# Patient Record
Sex: FEMALE | Race: WHITE | NOT HISPANIC OR LATINO | Employment: FULL TIME | URBAN - NONMETROPOLITAN AREA
[De-identification: names, ages, dates, MRNs, and addresses within clinical notes are randomized per-mention and may not be internally consistent; named-entity substitution may affect disease eponyms.]

---

## 2017-01-12 ENCOUNTER — COMMUNICATION - GICH (OUTPATIENT)
Dept: FAMILY MEDICINE | Facility: OTHER | Age: 37
End: 2017-01-12

## 2017-05-26 ENCOUNTER — HISTORY (OUTPATIENT)
Dept: EMERGENCY MEDICINE | Facility: OTHER | Age: 37
End: 2017-05-26

## 2017-06-02 ENCOUNTER — HOSPITAL ENCOUNTER (OUTPATIENT)
Dept: PHYSICAL THERAPY | Facility: OTHER | Age: 37
Setting detail: THERAPIES SERIES
End: 2017-06-02
Attending: PHYSICIAN ASSISTANT

## 2017-06-02 DIAGNOSIS — S83.011A: ICD-10-CM

## 2017-06-05 ENCOUNTER — HOSPITAL ENCOUNTER (OUTPATIENT)
Dept: PHYSICAL THERAPY | Facility: OTHER | Age: 37
Setting detail: THERAPIES SERIES
End: 2017-06-05
Attending: PHYSICIAN ASSISTANT

## 2017-12-28 NOTE — PROGRESS NOTES
"Patient Information     Patient Name MRN Sex Harleen Gomez [id number] Female 1980      Progress Notes by Jenni Sandoval PT at 2017  3:48 PM     Author:  Jenni Sandoval PT Service:  (none) Author Type:  PT- Physical Therapist     Filed:  2017 12:48 PM Date of Service:  2017  3:48 PM Status:  Signed     :  Jenni Sandoval PT (PT- Physical Therapist)            [facility]  Outpatient PT - Daily note    Date of Service: 2017   Visit #2    PSFS Visit:  2/10  PSFS Completed:  2017     Patient Name: Harleen Riddle   YOB: 1980   Referring MD/Provider: Tyson Burleson PA  Medical and Treatment Diagnosis: lateral subluxation of patella, Right  PT Treatment Diagnosis: impaired right knee range of motion and strength  Insurance: Other: IM Care  Start of Service: 17  Certification Dates: Start of Service: 17  Medicare/MA Re-Cert Due: 17     Subjective        Tape and ice machine really helped. Had minimal pain until she took the tape off last night.    Rates pain: 3/10    Objective  DATE:         ROM:  Supine knee flexion  65 A  85 P 120 P          Today's Intervention:    - bike seat 4 no resistance forward and retro x3 mins, level 6 x3 mins    Standing hip 3 way 3# with ankle weight    Med x   Leg press 140# seat 19 with ball squeeze between knees   Adduction 70#   Abduction 40#    Step ups 6\"     Knee range of motion and patellar mobilization x5 mins    kinesiotape application facilitating medial patellar glide and patellar lift   -2 i strips    Game ready x15 mins. Patient supine with bolster under knee     Home Exercise Program:    Access Code: [email or web address] URL: [email or web address] Date: 2017 Prepared by: Jenni Sandoval   Exercises   Sitting Heel Slide with Towel - 10-15 reps - 1-2 sets - 5 seconds hold - 1x daily   Seated Long Arc Quad with Hip Adduction - 10-15 reps - 1-2 sets - 5 seconds " hold - 1x daily     Assessment    Patient demonstrates excellent progression of knee range of motion 35 degrees since initial visit last week. Able to tolerate therapeutic exercise without significant increase in pain.    Patient will benefit from continued skilled physical therapy services to address aforementioned impairments and return patient to previous level of functioning.      Completed 6/2/2017:  Patient Specific Functional and Pain Scales (PSFS)  Clinician Instructions: Complete after the history and before the exam.   Initial Assessment:   We want to know what 3 activities in your life you are unable to perform, or are having the most difficulty performing, as a result of your chief problem. Please list and score at least 3 activities that you are unable to perform, or having the most difficulty performing, because of your chief problem.   Patient Specific Activity Scoring Scheme (score one number for each activity):   Activity Score (0-10)  0= Unable to perform activity  10= Able to perform activity at same level as before injury or problem   1. Walking up and down stairs reciprocally 3/10   2. Sit to stand 4/10   3. sleeping 3/10   4.  /10   5.  /10   Totals:  10/30= 33% ability, 67% impairment   Patient verbally states that they understand that the information they have provided above is current and complete to the best of their knowledge.   Patient Specific Functional Scale Modifier Scale Conversion: (patient's modifier that correlates with pt's score on PSFS): 4-CL (60% Impaired).  Initial Primary G Code and Modifier:    Per the Patient's intake and/or assessment the Primary G Code is: Mobility .   The Patient's Impairment, Limitation or Restriction Modifier would be best described as: CK - 40% - 60% Impairment.   Goal Primary G Code and Modifier:    The Patient's G Code Goal would be: Mobility    The Patient's Impairment, Limitation or Restriction Modifier goal would be best described as:  CI - 1% - 20% Impairment.     Goals:  Patient goal:  Improve knee pain in 8 weeks.    Functional Short Term Goals (4 weeks):   Patient will demonstrate improved swelling to allow knee flexion to at least 100 degrees.  Patient will have normal gait pattern without assistive device use even with normal daily walking.    Functional Long Term Goals (8 weeks):   Patient will have at least 115 degrees knee flexion with minimal increase in discomfort.  Patient will have improved lower extremity strength to at least 4/5 for ability to negotiate stairs reciprocally.  Patient will be independent in her home exercise program.    Plan    Treatment Plan:      Frequency:   16 visits    Duration of Treatment: 8 weeks    Plan for next visit:  Upright bike, knee range of motion and stretching, strengthening and progress home exercise program as able.    Thank you for your referral to [facility].  Please call with any questions, concerns or comments.  [phone]  Jenni Sandoval, PT, DPT

## 2017-12-29 NOTE — DISCHARGE SUMMARY
Patient Information     Patient Name MRN Sex Harleen Allen [id number] Female 1980      Discharge Summaries by Jenni Sandoval PT at 2017  4:27 PM     Author:  Jenni Sandoval PT Service:  (none) Author Type:  PT- Physical Therapist     Filed:  2017  4:28 PM Date of Service:  2017  4:27 PM Status:  Signed     :  Jenni Sandoval PT (PT- Physical Therapist)            [facility]  Outpatient PT - Discharge Summary    Date of discharge: 2017     Patient Name: Harleen Riddle   YOB: 1980   Referring MD/Provider: Tyson Burleson PA  Medical and Treatment Diagnosis: lateral subluxation of patella, Right  PT Treatment Diagnosis: impaired right knee range of motion and strength  Insurance: Other:  Care  Start of Service: 17    Dates of Service: 17    Thru:  17  Number of visits: 2           Reason for Discharge:  Patient discontinued Therapy for unknown reasons  Patient failed to schedule further appointments    Progress from Initial to Discharge (if applicable):    Comments: Please see last visit note from 17 for details of patient progress. This is an unplanned discharge.    Further Recommendations:    Patient will continue with established home exercise program  Patient will return to physician for follow-up.    Patient is discharged from Physical Therapy    Thank you for your referral to [facility].  Please call with any questions, concerns or comments.  [phone]          Jenni Sandoval PT, DPT

## 2017-12-30 NOTE — INITIAL ASSESSMENTS
Patient Information     Patient Name MRN Sex Harleen Gomez [id number] Female 1980      Initial Assessments by Jenni Sandoval PT at 2017 11:23 AM     Author:  Jenni Sandoval PT Service:  (none) Author Type:  PT- Physical Therapist     Filed:  2017  1:17 PM Date of Service:  2017 11:23 AM Status:  Signed     :  Jenni Sandoval PT (PT- Physical Therapist)            [facility]  Outpatient PT - Initial Evaluation  Lower Extremity Eval    Date of Service: 2017   Visit #1    PSFS Visit:  1/10  PSFS Completed:  2017     Patient Name: Harleen Riddle   YOB: 1980   Referring MD/Provider: Tyson Burleson PA  Medical and Treatment Diagnosis: lateral subluxation of patella, Right  PT Treatment Diagnosis: impaired right knee range of motion and strength  Insurance: Other: IM Care  Start of Service: 17  Certification Dates: Start of Service: 17  Medicare/MA Re-Cert Due: 17     Precautions:  None   Cognition:  Oriented to Person, Place, and Time.     Were cultural / age or other special adaptations needed? No      Patient is a vulnerable adult: No      Patient is aware of diagnosis: Yes      Risks and benefits explained: Yes    Subjective        Date of onset: 17 twisting on planted knee, heard a pop with pain in the right knee. Was seen in the ED with xrays showing no fracture.  Follow up with physician:    Details: difficulty with knee bending and fully straightening. A lot of walking bothers and being in one position too long bothers.    Rates pain: 4-5/10  Describes pain: dull, cramping; sharp after a lot of walking  Locates pain: suprapatellar and medial knee and thigh, kneecap  Aggravating: walking (after), stairs, sit to stand  Easing: ice      Completed 2017:  Patient Specific Functional and Pain Scales (PSFS)  Clinician Instructions: Complete after the history and before the exam.   Initial Assessment:    We want to know what 3 activities in your life you are unable to perform, or are having the most difficulty performing, as a result of your chief problem. Please list and score at least 3 activities that you are unable to perform, or having the most difficulty performing, because of your chief problem.   Patient Specific Activity Scoring Scheme (score one number for each activity):   Activity Score (0-10)  0= Unable to perform activity  10= Able to perform activity at same level as before injury or problem   1. Walking up and down stairs reciprocally 3/10   2. Sit to stand 4/10   3. sleeping 3/10   4.  /10   5.  /10   Totals:  10/30= 33% ability, 67% impairment   Patient verbally states that they understand that the information they have provided above is current and complete to the best of their knowledge.   Patient Specific Functional Scale Modifier Scale Conversion: (patient's modifier that correlates with pt's score on PSFS): 4-CL (60% Impaired).  Initial Primary G Code and Modifier:    Per the Patient's intake and/or assessment the Primary G Code is: Mobility .   The Patient's Impairment, Limitation or Restriction Modifier would be best described as: CK - 40% - 60% Impairment.   Goal Primary G Code and Modifier:    The Patient's G Code Goal would be: Mobility    The Patient's Impairment, Limitation or Restriction Modifier goal would be best described as: CI - 1% - 20% Impairment.     Functional difficulties: (Min / Mod / Max / Unable)   mod difficulty sleeping     mod difficulty standing 30 minutes     max difficulty walking up and down stairs reciprocally    mod difficulty walking on level ground    max difficulty walking on uneven/slopes ground   unable difficulty running for 30 min.   unable difficulty squatting to lift boxes from floor      Prior Level:  Minimal to no difficulty completing functional activities.      Past Medical History:     Diagnosis  Date     Abnormal Pap smear 2003      Genital warts      History of chlamydia 2002     treated with Zithromax      History of pregnancy      ,History of greater than 4000 gram infant      Past Surgical History:      Procedure  Laterality Date     PA INCISION AND DRAINAGE OF HEMATOMA/SEROMA OR FLUID  2010    post-tubal ligation, rt salpingectomy       PA LIGATE FALLOPIAN TUBE  2010     Driving:  yes  Home Environment:  Patient lives in a 2story home with flight of steps to enter and 1 flights up/down stairs.  Assistive Devices: no    Occupation:  Direct support person, involves driving and cooking    Previous Treatment:    Pain Meds / Anti-inflammatory Meds  - Yes ibu  Physical Therapy - no  Injections - no  Surgery - No  Pain Clinic - No    Diagnostics:  Reviewed (see chart)   Current Medications:  Reviewed (see chart)    Drug Allergies:  Reviewed (see chart)  ?   Latex Allergy:  No    Objective    Items left blank indicate that the test was inappropriate or not meaningful at the time of evaluation and therefore not performed.    Fall Risk Screening:  No risk factors identified     ROM / Strength:                 Norms Left  Right Strength Left  Right   Hip Flexion 120    Hip Flexion  3-   Hip Extension 15   Hip Extension  3   Hip Abduction 50   Hip Abduction  3   Hip External Rot. 60   Hip External Rot.     Hip Internal Rot. 40   Hip Internal Rot.     Knee Extension 0 0 -5 A  0 P Knee Extension  3   Knee Flexion 135 125 65 A  85 P Knee Flexion  4+   Dorsiflexion 20   Dorsiflexion     Plantarflexion    Plantarflexion       Observation:  Caron patellar swelling evident, lateral and superior pole    Palpation:  Mild tenderness to quad and patellar tendon    Gait:  Mild to moderate antalgia without assistive device     Special Tests:      Knee special tests Result   Varus/Valgus stress + varus   Posterior sag sign -   Lachman NT   Anterior drawer NT   Pivot shift -   Baldomero s -   Apley s -   Thessaly NT   Patellar compression +     Today's  Intervention:    - Evaluation  - Patient education for results of evaluation, goals, and treatment plan.  Patient voices understanding and agreement.    kinesiotape application facilitating medial patellar glide and patellar lift   -2 i strips   -Patient was instructed in care of tape. Instructed to remove tape if skin becomes red, itchy, or any other adverse reaction occurs, and wash with soap and water. Tape can be left on for 2-4 days as tolerated and can be worn in the shower. Instructed to gently towel-dry over tape and to never use a hair dryer on tape. Recommended to remove tape the night before, or the morning of, the next physical therapy appointment to allow skin rest time. Patient voiced understanding.    - Intro to home exercise program   - attempted mini squats but too painful so DC     Game ready x10 mins. Patient supine with bolster under knee     Home Exercise Program:    Access Code: [email or web address] URL: [email or web address] Date: 06/02/2017 Prepared by: Jenni Sandoval   Exercises   Sitting Heel Slide with Towel - 10-15 reps - 1-2 sets - 5 seconds hold - 1x daily   Seated Long Arc Quad with Hip Adduction - 10-15 reps - 1-2 sets - 5 seconds hold - 1x daily     Assessment    Therapist Assessment / Clinical Impression:  Signs and symptoms consistent with ligamentous injury resulting in patellar compression and abnormal joint mechanics with normal activities of daily living. Patient will benefit from continued skilled physical therapy services to address aforementioned impairments and return patient to previous level of functioning.      Functional Impairment(s): See subjective on initial evaluation and Functional Assessment / Summary Report from PSFS.    Physical Impairment(s):  Knee range of motion and strength      Goals:  Patient goal:  Improve knee pain in 8 weeks.    Functional Short Term Goals (4 weeks):   Patient will demonstrate improved swelling to allow knee flexion to at least 100  degrees.  Patient will have normal gait pattern without assistive device use even with normal daily walking.    Functional Long Term Goals (8 weeks):   Patient will have at least 115 degrees knee flexion with minimal increase in discomfort.  Patient will have improved lower extremity strength to at least 4/5 for ability to negotiate stairs reciprocally.  Patient will be independent in her home exercise program.    Patient participated in goal selection and understand(s) the plan of care: Yes   Patient Potential for Achieving Desired Outcome: Good       Response to Intervention:  Good tolerance to treatment     Plan    Treatment Plan:      Frequency:   16 visits    Duration of Treatment: 8 weeks    Planned Interventions:    Home Exercise Program development  Therapeutic Exercise (ROM & Strengthening)  Therapeutic Activities  Neuromuscular Re-education  Manual Therapy  Ultrasound  E-Stim  Gait Training  Hot Packs / Cold Pack Modalities  Vasopneumatic Compression with Cold Therapy ( Game Ready )  Phonophoresis with Ketoprofen  Iontophoresis with Dexamethasone    Plan for next visit:  Upright bike, knee range of motion and stretching, strengthening and progress home exercise program as able.    Thank you for your referral to [facility].  Please call with any questions, concerns or comments.  [phone]  Jenni Sandoval PT, DPT    The signature, of the referring medical provider, on this document indicates certification of the above prescribed plan of care and is medically necessary.

## 2018-01-02 NOTE — TELEPHONE ENCOUNTER
Patient Information     Patient Name MRN Harleen Nicholas [id number] Female 1980      Telephone Encounter by Ashley Bray at 2017  9:18 AM     Author:  Ashley Bray Service:  (none) Author Type:  (none)     Filed:  2017  9:18 AM Encounter Date:  2017 Status:  Signed     :  Ashley Bray            Form changed, and re-faxed.  Ashley Bray LPN .............2017  9:18 AM

## 2018-01-03 NOTE — TELEPHONE ENCOUNTER
Patient Information     Patient Name MRN Harleen Nicholas [id number] Female 1980      Telephone Encounter by Ashley Bray at 2017  9:02 AM     Author:  Ashley Bray Service:  (none) Author Type:  (none)     Filed:  2017  9:18 AM Encounter Date:  2017 Status:  Signed     :  Ashley Bray            Patient is calling wondering if Arik Nayak MD can change the dates on mar[employer] form that she was absent from work starting on -12/15. They wont approve the marcos absent unless its at least 4 days. Form was only completed for 3 days. Patient states if form is not changed she is over her amount of absences and will be terminated from work at [employer]. States a[employer]wick said ok to change old form and initial where changes are made.  Ashley Bray LPN .............2017  9:08 AM

## 2018-02-12 ENCOUNTER — DOCUMENTATION ONLY (OUTPATIENT)
Dept: FAMILY MEDICINE | Facility: OTHER | Age: 38
End: 2018-02-12

## 2018-02-12 PROBLEM — Z72.0 TOBACCO ABUSE: Status: ACTIVE | Noted: 2018-02-12

## 2018-02-12 RX ORDER — IBUPROFEN 600 MG/1
600 TABLET, FILM COATED ORAL 4 TIMES DAILY PRN
COMMUNITY
Start: 2017-05-26 | End: 2018-11-21

## 2018-03-25 ENCOUNTER — HEALTH MAINTENANCE LETTER (OUTPATIENT)
Age: 38
End: 2018-03-25

## 2018-11-21 ENCOUNTER — OFFICE VISIT (OUTPATIENT)
Dept: FAMILY MEDICINE | Facility: OTHER | Age: 38
End: 2018-11-21
Attending: NURSE PRACTITIONER
Payer: COMMERCIAL

## 2018-11-21 VITALS
TEMPERATURE: 97.5 F | HEART RATE: 76 BPM | BODY MASS INDEX: 39.68 KG/M2 | OXYGEN SATURATION: 98 % | WEIGHT: 234.8 LBS | RESPIRATION RATE: 16 BRPM

## 2018-11-21 DIAGNOSIS — H60.501 ACUTE OTITIS EXTERNA OF RIGHT EAR, UNSPECIFIED TYPE: Primary | ICD-10-CM

## 2018-11-21 PROCEDURE — 99213 OFFICE O/P EST LOW 20 MIN: CPT | Performed by: NURSE PRACTITIONER

## 2018-11-21 PROCEDURE — G0463 HOSPITAL OUTPT CLINIC VISIT: HCPCS

## 2018-11-21 RX ORDER — OFLOXACIN 3 MG/ML
10 SOLUTION AURICULAR (OTIC) 2 TIMES DAILY
Qty: 10 ML | Refills: 0 | Status: SHIPPED | OUTPATIENT
Start: 2018-11-21 | End: 2018-11-28

## 2018-11-21 ASSESSMENT — PAIN SCALES - GENERAL: PAINLEVEL: MILD PAIN (2)

## 2018-11-21 NOTE — PROGRESS NOTES
HPI:    Harleen Riddle is a 38 year old female who presents to clinic today for right ear pain. Has had right ear pain since yesterday. Pain worse with pressure being applied. No cold sx. No fevers. No hx of OM.     Past Medical History:   Diagnosis Date     Anogenital (venereal) warts     No Comments Provided     Personal history of other infectious and parasitic diseases     , treated with Zithromax     Personal history of other medical treatment (CODE)          Personal history of other medical treatment (CODE)     2003, ,History of greater than 4000 gram infant       Past Surgical History:   Procedure Laterality Date     OTHER SURGICAL HISTORY      ,01095.0,MD LIGATE FALLOPIAN TUBE     OTHER SURGICAL HISTORY      ,76555.0,MD INCISION AND DRAINAGE OF HEMATOMA/SEROMA OR FLUID,post-tubal ligation, rt salpingectomy       Family History   Problem Relation Age of Onset     Thyroid Disease Mother      Thyroid Disease     Hypertension Father      Hypertension     Other - See Comments Father      Back problems     Genetic Disorder Other      Genetic,Mother History of thyroid disease.-Father History of hypertension and back problems.-Siblings Sister with history of Down syndrome. -Brother with history of mental retardation.  -Mukesh  Son born 2003-Bianca  Daughter born 10/06     Other - See Comments Sister      Downs syndrome     Other - See Comments Brother      Mental retardation       Social History     Social History     Marital status: Single     Spouse name: HAYDEE Madrigal     Number of children: N/A     Years of education: 12     Occupational History     Not on file.     Social History Main Topics     Smoking status: Current Some Day Smoker     Types: Cigarettes     Start date: 10/23/1998     Smokeless tobacco: Never Used     Alcohol use 0.0 oz/week      Comment: Alcoholic Drinks/day: rare     Drug use: No     Sexual activity: Yes     Other Topics Concern     Not on file     Social History  Narrative     Works at [employer]    Mukesh  Son born 8/2003    Bianca  Daughter born 10/06    Daughter:  Natalia       Current Outpatient Prescriptions   Medication Sig Dispense Refill     ofloxacin (FLOXIN) 0.3 % otic solution Place 10 drops into the right ear 2 times daily for 7 days 10 mL 0       No Known Allergies    ROS:  Pertinent positives and negatives are noted in HPI.    EXAM:  General appearance: well appearing female in no acute distress  Head: normocephalic, atraumatic  Ears: TM's with cone of light, no erythema, right canal with significant swelling. Pain with all movement of ear  Eyes: conjunctivae normal  Orophayrnx: moist mucous membranes, tonsils without erythema, exudates or petechiae, no post nasal drip seen  Neck: supple without adenopathy  Respiratory: clear to auscultation bilaterally  Cardiac: RRR with no murmurs  Psychological: normal affect, alert and pleasant    ASSESSMENT AND PLAN:    1. Acute otitis externa of right ear, unspecified type      Tx AOE with ofloxacin. Reviewed need to complete all antibiotics. Discussed typical course of illness, symptomatic treatment and when to return to clinic. Patient in agreement with plan and all questions were answered.         Jenn Cote..................11/21/2018 5:32 PM

## 2018-11-21 NOTE — PATIENT INSTRUCTIONS
External Ear Infection (Adult)    External otitis (also called  swimmer s ear ) is an infection in the ear canal. It is often caused by bacteria or fungus. It can occur a few days after water gets trapped in the ear canal (from swimming or bathing). It can also occur after cleaning too deeply in the ear canal with a cotton swab or other object. Sometimes, hair care products get into the ear canal and cause this problem.  Symptoms can include pain, fever, itching, redness, drainage, or swelling of the ear canal. Temporary hearing loss may also occur.  Home care    Do not try to clean the ear canal. This can push pus and bacteria deeper into the canal.    Use prescribed ear drops as directed. These help reduce swelling and fight the infection. If an ear wick was placed in the ear canal, apply drops right onto the end of the wick. The wick will draw the medicine into the ear canal even if it is swollen closed.    A cotton ball may be loosely placed in the outer ear to absorb any drainage.    You may use acetaminophen or ibuprofen to control pain, unless another medicine was prescribed. Note: If you have chronic liver or kidney disease or ever had a stomach ulcer or GI bleeding, talk to your healthcare provider before taking any of these medicines.    Do not allow water to get into your ear when bathing. Also, don't swim until the infection has cleared.  Prevention    Keep your ears dry. This helps lower the risk of infection. Dry your ears with a towel or hair dryer after getting wet. Also, use ear plugs when swimming.    Do not stick any objects in the ear to remove wax.    If you feel water trapped in your ear, use ear drops right away. You can get these drops over the counter at most drugstores. They work by removing water from the ear canal.  Follow-up care  Follow up with your healthcare provider in 1 week, or as advised.  When to seek medical advice  Call your healthcare provider right away if any of these  occur:    Ear pain becomes worse or doesn t improve after 3 days of treatment    Redness or swelling of the outer ear occurs or gets worse    Headache    Painful or stiff neck    Drowsiness or confusion    Fever of 100.4 F (38 C) or higher, or as directed by your healthcare provider    Seizure  Date Last Reviewed: 10/1/2017    9962-5904 [employer]. [address] [place] [address]. All rights reserved. This information is not intended as a substitute for professional medical care. Always follow your healthcare professional's instructions.

## 2018-11-21 NOTE — MR AVS SNAPSHOT
After Visit Summary   11/21/2018    Harleen Riddle    MRN: [id number]           Patient Information     Date Of Birth          1980        Visit Information        Provider Department      11/21/2018 5:15 PM Jenn Cote APRN CNP [facility]        Today's Diagnoses     Acute otitis externa of right ear, unspecified type    -  1      Care Instructions      External Ear Infection (Adult)    External otitis (also called  swimmer s ear ) is an infection in the ear canal. It is often caused by bacteria or fungus. It can occur a few days after water gets trapped in the ear canal (from swimming or bathing). It can also occur after cleaning too deeply in the ear canal with a cotton swab or other object. Sometimes, hair care products get into the ear canal and cause this problem.  Symptoms can include pain, fever, itching, redness, drainage, or swelling of the ear canal. Temporary hearing loss may also occur.  Home care    Do not try to clean the ear canal. This can push pus and bacteria deeper into the canal.    Use prescribed ear drops as directed. These help reduce swelling and fight the infection. If an ear wick was placed in the ear canal, apply drops right onto the end of the wick. The wick will draw the medicine into the ear canal even if it is swollen closed.    A cotton ball may be loosely placed in the outer ear to absorb any drainage.    You may use acetaminophen or ibuprofen to control pain, unless another medicine was prescribed. Note: If you have chronic liver or kidney disease or ever had a stomach ulcer or GI bleeding, talk to your healthcare provider before taking any of these medicines.    Do not allow water to get into your ear when bathing. Also, don't swim until the infection has cleared.  Prevention    Keep your ears dry. This helps lower the risk of infection. Dry your ears with a towel or hair dryer after getting wet. Also, use ear plugs when  swimming.    Do not stick any objects in the ear to remove wax.    If you feel water trapped in your ear, use ear drops right away. You can get these drops over the counter at most drugstores. They work by removing water from the ear canal.  Follow-up care  Follow up with your healthcare provider in 1 week, or as advised.  When to seek medical advice  Call your healthcare provider right away if any of these occur:    Ear pain becomes worse or doesn t improve after 3 days of treatment    Redness or swelling of the outer ear occurs or gets worse    Headache    Painful or stiff neck    Drowsiness or confusion    Fever of 100.4 F (38 C) or higher, or as directed by your healthcare provider    Seizure  Date Last Reviewed: 10/1/2017    2003-3339 [employer]. [address] [place] [address], [place] [address]. All rights reserved. This information is not intended as a substitute for professional medical care. Always follow your healthcare professional's instructions.                Follow-ups after your visit        Who to contact     If you have questions or need follow up information about today's clinic visit or your schedule please contact [facility] directly at [phone].  Normal or non-critical lab and imaging results will be communicated to you by MyChart, letter or phone within 4 business days after the clinic has received the results. If you do not hear from us within 7 days, please contact the clinic through [employer] or phone. If you have a critical or abnormal lab result, we will notify you by phone as soon as possible.  Submit refill requests through [email or web address] or call your pharmacy and they will forward the refill request to us. Please allow 3 business days for your refill to be completed.          Additional Information About Your Visit        Care EveryWhere ID     This is your Care EveryWhere ID. This could be used by other organizations to access your [facility]  records  [id number]        Your Vitals Were     Pulse Temperature Respirations Last Period Pulse Oximetry Breastfeeding?    76 97.5  F (36.4  C) (Tympanic) 16 10/21/2018 98% No    BMI (Body Mass Index)                   39.68 kg/m2            Blood Pressure from Last 3 Encounters:   12/13/16 112/84   01/12/16 (!) 140/100   12/31/14 124/84    Weight from Last 3 Encounters:   11/21/18 234 lb 12.8 oz (106.5 kg)   12/13/16 227 lb 12.8 oz (103.3 kg)   01/12/16 243 lb (110.2 kg)              Today, you had the following     No orders found for display         Today's Medication Changes          These changes are accurate as of 11/21/18  5:40 PM.  If you have any questions, ask your nurse or doctor.               Start taking these medicines.        Dose/Directions    ofloxacin 0.3 % otic solution   Commonly known as:  FLOXIN   Used for:  Acute otitis externa of right ear, unspecified type   Started by:  Jenn Cote APRN CNP        Dose:  10 drop   Place 10 drops into the right ear 2 times daily for 7 days   Quantity:  10 mL   Refills:  0         Stop taking these medicines if you haven't already. Please contact your care team if you have questions.     ibuprofen 600 MG tablet   Commonly known as:  ADVIL/MOTRIN   Stopped by:  Jenn Cote APRN CNP                Where to get your medicines      These medications were sent to [facility] [address] [place] [address]     Phone:  [phone]     ofloxacin 0.3 % otic solution                Primary Care Provider Office Phone # Fax #    Bethany VASILE Gomes -[phone]       [address] [facility] [address]        Equal Access to Services     DWIGHT LLOYD AH: Gato Pedraza, wavitoda luqadaha, qaybta kaalteresa fields, yamini english. So [facility] [phone].    ATENCIÓN: Si ashleyla español, tiene a bazan disposición servicios  tony de asistencia lingüística. Elana arce [phone].    We comply with applicable federal civil rights laws and [place] laws. We do not discriminate on the basis of race, color, national origin, age, disability, sex, sexual orientation, or gender identity.            Thank you!     Thank you for choosing [facility]  for your care. Our goal is always to provide you with excellent care. Hearing back from our patients is one way we can continue to improve our services. Please take a few minutes to complete the written survey that you may receive in the mail after your visit with us. Thank you!             Your Updated Medication List - Protect others around you: Learn how to safely use, store and throw away your medicines at www.disposemymeds.org.          This list is accurate as of 11/21/18  5:40 PM.  Always use your most recent med list.                   Brand Name Dispense Instructions for use Diagnosis    ofloxacin 0.3 % otic solution    FLOXIN    10 mL    Place 10 drops into the right ear 2 times daily for 7 days    Acute otitis externa of right ear, unspecified type

## 2018-11-21 NOTE — NURSING NOTE
EAR PAIN  Onset: yesterday  Location:  right  Fever:  no  Recent URI:  no  Discharge:  no  Able to sleep:  no  Pain Scale:  2, 8 with palpation  Gracia Rose LPN .............11/21/2018  5:31 PM  Medication Reconciliation: complete    Gracia Rose LPN  ..................11/21/2018   5:31 PM

## 2020-10-09 ENCOUNTER — VIRTUAL VISIT (OUTPATIENT)
Dept: FAMILY MEDICINE | Facility: OTHER | Age: 40
End: 2020-10-09

## 2020-10-09 NOTE — PROGRESS NOTES
"Date: 10/09/2020 12:23:32  Clinician: Minnie Dubose  Clinician NPI: [id number]  Patient: Harleen Riddle  Patient : 1980  [facility] [address] [place] [address]  Patient Phone: [phone]  Visit Protocol: URI  Patient Summary:  Harleen is a 39 year old ( : 1980 ) female who initiated a [facility] Visit for COVID-19 (Coronavirus) evaluation and screening. When asked the question \"Please sign me up to receive news, health information and promotions. \", Harleen responded \"No\".    Harleen states her symptoms started suddenly 5-6 days ago.   Her symptoms consist of malaise.   Harleen denies having ear pain, headache, wheezing, fever, enlarged lymph nodes, cough, nasal congestion, anosmia, vomiting, rhinitis, nausea, facial pain or pressure, myalgias, chills, sore throat, teeth pain, ageusia, and diarrhea. She also denies double sickening (worsening symptoms after initial improvement), taking antibiotic medication in the past month, and having recent facial or sinus surgery in the past 60 days. She is not experiencing dyspnea.    Pertinent COVID-19 (Coronavirus) information  In the past 14 days, Harleen has not worked in a congregate living setting.   She does not work or volunteer as healthcare worker or a  and does not work or volunteer in a healthcare facility.   Harleen also has not lived in a congregate living setting in the past 14 days. She does not live with a healthcare worker.   Harleen has not had a close contact with a laboratory-confirmed COVID-19 patient within 14 days of symptom onset.   Since 2019, Harleen and has had upper respiratory infection (URI) or influenza-like illness. Has not been diagnosed with lab-confirmed COVID-19 test      Date(s) of previous URI or influenza-like illness (free-text): -     Symptoms Harleen experienced during previous URI or influenza-like illness as reported by the patient (free-text): Chest pain, severe " cough, body aches,  fever        Pertinent medical history  Harleen does not get yeast infections when she takes antibiotics.   Harleen needs a return to work/school note.   Weight: 210 lbs   Harleen smokes or uses smokeless tobacco.   She denies pregnancy and denies breastfeeding. She is currently menstruating.   Weight: 210 lbs    MEDICATIONS: No current medications, ALLERGIES: NKDA  Clinician Response:  Dear Harleen,   Your symptoms show that you may have coronavirus (COVID-19). This illness can cause fever, cough and trouble breathing. Many people get a mild case and get better on their own. Some people can get very sick.  What should I do?  We would like to test you for this virus.   1. Please call [phone] to schedule your visit. Explain that you were referred by [facility] to have a COVID-19 test. Be ready to share your [facility] visit ID number.  The following will serve as your written order for this COVID Test, ordered by me, for the indication of suspected COVID [Z20.828]: The test will be ordered in [employer], our electronic health record, after you are scheduled. It will show as ordered and authorized by Avinash Han MD.  Order: COVID-19 (Coronavirus) PCR for SYMPTOMATIC testing from [facility].      2. When it's time for your COVID test:  Stay at least 6 feet away from others. (If someone will drive you to your test, stay in the backseat, as far away from the  as you can.)   Cover your mouth and nose with a mask, tissue or washcloth.  Go straight to the testing site. Don't make any stops on the way there or back.      3.Starting now: Stay home and away from others (self-isolate) until:   You've had no fever---and no medicine that reduces fever---for one full day (24 hours). And...   Your other symptoms have gotten better. For example, your cough or breathing has improved. And...   At least 10 days have passed since your symptoms started.       During this time, don't leave the house except for testing or  "medical care.   Stay in your own room, even for meals. Use your own bathroom if you can.   Stay away from others in your home. No hugging, kissing or shaking hands. No visitors.  Don't go to work, school or anywhere else.    Clean \"high touch\" surfaces often (doorknobs, counters, handles, etc.). Use a household cleaning spray or wipes. You'll find a full list of  on the EPA website: www.epa.gov/pesticide-registration/list-n-disinfectants-use-against-sars-cov-2.   Cover your mouth and nose with a mask, tissue or washcloth to avoid spreading germs.  Wash your hands and face often. Use soap and water.  Caregivers in these groups are at risk for severe illness due to COVID-19:  o People 65 years and older  o People who live in a nursing home or long-term care facility  o People with chronic disease (lung, heart, cancer, diabetes, kidney, liver, immunologic)  o People who have a weakened immune system, including those who:   Are in cancer treatment  Take medicine that weakens the immune system, such as corticosteroids  Had a bone marrow or organ transplant  Have an immune deficiency  Have poorly controlled HIV or AIDS  Are obese (body mass index of 40 or higher)  Smoke regularly   o Caregivers should wear gloves while washing dishes, handling laundry and cleaning bedrooms and bathrooms.  o Use caution when washing and drying laundry: Don't shake dirty laundry, and use the warmest water setting that you can.  o For more tips, go to www.cdc.gov/coronavirus/2019-ncov/downloads/10Things.pdf.    4.Sign up for [employer]. We know it's scary to hear that you might have COVID-19. We want to track your symptoms to make sure you're okay over the next 2 weeks. Please look for an email from [employer]---this is a free, online program that we'll use to keep in touch. To sign up, follow the link in the email. Learn more at [email or web address]  How can I take care of myself?   Get lots of rest. Drink extra " fluids (unless a doctor has told you not to).   Take Tylenol (acetaminophen) for fever or pain. If you have liver or kidney problems, ask your family doctor if it's okay to take Tylenol.   Adults can take either:    650 mg (two 325 mg pills) every 4 to 6 hours, or...   1,000 mg (two 500 mg pills) every 8 hours as needed.    Note: Don't take more than 3,000 mg in one day. Acetaminophen is found in many medicines (both prescribed and over-the-counter medicines). Read all labels to be sure you don't take too much.   For children, check the Tylenol bottle for the right dose. The dose is based on the child's age or weight.    If you have other health problems (like cancer, heart failure, an organ transplant or severe kidney disease): Call your specialty clinic if you don't feel better in the next 2 days.       Know when to call 911. Emergency warning signs include:    Trouble breathing or shortness of breath Pain or pressure in the chest that doesn't go away Feeling confused like you haven't felt before, or not being able to wake up Bluish-colored lips or face.  Where can I get more information?   [facility] -- About COVID-19: [email or web address]   CDC -- What to Do If You're Sick: www.cdc.gov/coronavirus/2019-ncov/about/steps-when-sick.html   CDC -- Ending Home Isolation: www.cdc.gov/coronavirus/2019-ncov/hcp/disposition-in-home-patients.html   CDC -- Caring for Someone: www.cdc.gov/coronavirus/2019-ncov/if-you-are-sick/care-for-someone.html   [facility] -- Interim Guidance for Hospital Discharge to Home: www.health.[facility].[place]/diseases/coronavirus/hcp/hospdischarge.pdf   [facility] clinical trials (COVID-19 research studies): clinicalaffairs.[facility]/umn-clinical-trials    Below are the COVID-19 hotlines at the [place] Department of Health ([facility]). Interpreters are available.    For health questions: Call 774-861-9998 or 1-964.661.8651 (7 a.m. to 7 p.m.) For questions about schools and  childcare: Call 023-743-0534 or 1-425.180.2732 (7 a.m. to 7 p.m.)    COVID-19 (Coronavirus) General Information  Because there is currently no vaccine to prevent infection, the best way to protect yourself is to avoid being exposed to this virus. Common symptoms of COVID-19 include but are not limited to fever, cough, and shortness of breath. These symptoms appear 2-14 days after you are exposed to the virus that causes COVID-19. Click here for more information from the CDC on how to protect yourself.  If you are sick with COVID-19 or suspect you are infected with the virus that causes COVID-19, follow the steps here from the CDC to help prevent the disease from spreading to people in your home and community.  Click here for general information from the CDC on testing.  If you develop any of these emergency warning signs for COVID-19, get medical attention immediately:     Trouble breathing    Persistent pain or pressure in the chest    New confusion or inability to arouse    Bluish lips or face      Call your doctor or clinic before going in. Call [phone] if you have a medical emergency and notify the  you have or think you may have COVID-19.  For more detailed and up to date information on COVID-19 (Coronavirus), please visit the CDC website.   Diagnosis: Other malaise  Diagnosis ICD: R53.81

## 2020-10-16 ENCOUNTER — VIRTUAL VISIT (OUTPATIENT)
Dept: FAMILY MEDICINE | Facility: OTHER | Age: 40
End: 2020-10-16

## 2020-10-16 NOTE — PROGRESS NOTES
"Date: 10/16/2020 14:51:44  Clinician: Matthew Salguero  Clinician NPI: [id number]  Patient: Harleen Riddle  Patient : 1980  Patient Address: [address] [place] [address]  Patient Phone: [phone]  Visit Protocol: URI  Patient Summary:  Harleen is a 39 year old ( : 1980 ) female who initiated a [facility] Visit for COVID-19 (Coronavirus) evaluation and screening. When asked the question \"Please sign me up to receive news, health information and promotions. \", Harleen responded \"No\".    Harleen states her symptoms started 1-2 days ago.   Her symptoms consist of a headache, a cough, nasal congestion, rhinitis, nausea, malaise, and a sore throat. She is experiencing mild difficulty breathing with activities but can speak normally in full sentences.   Symptom details     Nasal secretions: The color of her mucus is white.    Cough: Harleen coughs a few times an hour and her cough is more bothersome at night. Phlegm comes into her throat when she coughs. She does not believe her cough is caused by post-nasal drip. The color of the phlegm is white and yellow.     Sore throat: Harleen reports having mild throat pain (1-3 on a 10 point pain scale), does not have exudate on her tonsils, and can swallow liquids. She is not sure if the lymph nodes in her neck are enlarged. A rash has not appeared on the skin since the sore throat started.     Headache: She states the headache is mild (1-3 on a 10 point pain scale).      Harleen denies having ear pain, wheezing, fever, anosmia, vomiting, facial pain or pressure, myalgias, chills, teeth pain, ageusia, and diarrhea. She also denies taking antibiotic medication in the past month and having recent facial or sinus surgery in the past 60 days.   Precipitating events  Harleen is not sure if she has been exposed to someone with strep throat. She has not recently been exposed to someone with influenza. Harleen has been in close contact with the following high risk " individuals: adults 65 or older.   Pertinent COVID-19 (Coronavirus) information  In the past 14 days, Harleen has not worked in a congregate living setting.   She does not work or volunteer as healthcare worker or a  and does not work or volunteer in a healthcare facility.   Harleen also has not lived in a congregate living setting in the past 14 days. She does not live with a healthcare worker.   Harleen has not had a close contact with a laboratory-confirmed COVID-19 patient within 14 days of symptom onset.   Since December 2019, Harleen and has had upper respiratory infection (URI) or influenza-like illness. Has not been diagnosed with lab-confirmed COVID-19 test      Date(s) of previous URI or influenza-like illness (free-text): The first week in February. And my daughter was extremely sick 2 weeks before my first symptom     Symptoms Harleen experienced during previous URI or influenza-like illness as reported by the patient (free-text): Body aches,  coughing,  fever,  loss of voice, more and worse body aches, cough that lasted around a couple months        Pertinent medical history  Harleen does not get yeast infections when she takes antibiotics.   Harleen needs a return to work/school note.   Weight: 200 lbs   Harleen smokes or uses smokeless tobacco.   She denies pregnancy and denies breastfeeding. She has menstruated in the past month.   Weight: 200 lbs    MEDICATIONS: No current medications, ALLERGIES: NKDA  Clinician Response:  Dear Harleen,   Your symptoms show that you may have coronavirus (COVID-19). This illness can cause fever, cough and trouble breathing. Many people get a mild case and get better on their own. Some people can get very sick.  Based on the symptoms you have shared, I would like you to be re-checked in 2 to 3 days. Please call your family clinic to set up a video or phone visit.  Will I be tested for COVID-19?  We would like to test you for this virus.   Please call  "[phone] to schedule your visit. Explain that you were referred by [facility] to have a COVID-19 test. Be ready to share your [facility] visit ID number.   The following will serve as your written order for this COVID Test, ordered by me, for the indication of suspected COVID [Z20.828]: The test will be ordered in [employer], our electronic health record, after you are scheduled. It will show as ordered and authorized by Avinash Han MD.  Order: COVID-19 (Coronavirus) PCR for SYMPTOMATIC testing from [facility].  1.When it's time for your COVID test:   Stay at least 6 feet away from others. (If someone will drive you to your test, stay in the backseat, as far away from the  as you can.)   Cover your mouth and nose with a mask, tissue or washcloth.  Go straight to the testing site. Don't make any stops on the way there or back.      2.Starting now: Stay home and away from others (self-isolate) until:   You've had no fever---and no medicine that reduces fever---for one full day (24 hours). And...   Your other symptoms have gotten better. For example, your cough or breathing has improved. And...   At least 10 days have passed since your symptoms started.       During this time, don't leave the house except for testing or medical care.   Stay in your own room, even for meals. Use your own bathroom if you can.   Stay away from others in your home. No hugging, kissing or shaking hands. No visitors.  Don't go to work, school or anywhere else.    Clean \"high touch\" surfaces often (doorknobs, counters, handles, etc.). Use a household cleaning spray or wipes. You'll find a full list of  on the EPA website: www.epa.gov/pesticide-registration/list-n-disinfectants-use-against-sars-cov-2.   Cover your mouth and nose with a mask, tissue or washcloth to avoid spreading germs.  Wash your hands and face often. Use soap and water.  Caregivers in these groups are at risk for severe illness due to COVID-19:  o People 65 years and older  " o People who live in a nursing home or long-term care facility  o People with chronic disease (lung, heart, cancer, diabetes, kidney, liver, immunologic)   o People who have a weakened immune system, including those who:   Are in cancer treatment  Take medicine that weakens the immune system, such as corticosteroids  Had a bone marrow or organ transplant  Have an immune deficiency  Have poorly controlled HIV or AIDS  Are obese (body mass index of 40 or higher)  Smoke regularly   o Caregivers should wear gloves while washing dishes, handling laundry and cleaning bedrooms and bathrooms.  o Use caution when washing and drying laundry: Don't shake dirty laundry, and use the warmest water setting that you can.  o For more tips, go to www.cdc.gov/coronavirus/2019-ncov/downloads/10Things.pdf.      How can I take care of myself?   Get lots of rest. Drink extra fluids (unless a doctor has told you not to)   Take Tylenol (acetaminophen) for fever or pain. If you have liver or kidney problems, ask your family doctor if it's okay to take Tylenol.   Adults can take either:    650 mg (two 325 mg pills) every 4 to 6 hours, or...   1,000 mg (two 500 mg pills) every 8 hours as needed.    Note: Don't take more than 3,000 mg in one day. Acetaminophen is found in many medicines (both prescribed and over-the-counter medicines). Read all labels to be sure you don't take too much.   For children, check the Tylenol bottle for the right dose. The dose is based on the child's age or weight.    If you have other health problems (like cancer, heart failure, an organ transplant or severe kidney disease): Call your specialty clinic if you don't feel better in the next 2 days.       Know when to call 911. Emergency warning signs include:    Trouble breathing or shortness of breath Pain or pressure in the chest that doesn't go away Feeling confused like you haven't felt before, or not being able to wake up Bluish-colored lips or face  Where can I  get more information?   [facility] -- About COVID-19: [email or web address]   CDC -- What to Do If You're Sick: www.cdc.gov/coronavirus/2019-ncov/about/steps-when-sick.html   [facility] -- Ending Home Isolation: www.cdc.gov/coronavirus/2019-ncov/hcp/disposition-in-home-patients.html   [facility] -- Caring for Someone: www.cdc.gov/coronavirus/2019-ncov/if-you-are-sick/care-for-someone.html   [facility] -- Interim Guidance for Hospital Discharge to Home: www.[facility].[place]/diseases/coronavirus/hcp/hospdischarge.pdf   [facility] clinical trials (COVID-19 research studies): clinicalaffairs.[facility]-clinical-trials    Below are the COVID-19 hotlines at the [place] Department of Health ([facility]). Interpreters are available.    For health questions: Call 480-641-7932 or 1-508.414.4577 (7 a.m. to 7 p.m.) For questions about schools and childcare: Call 592-720-9049 or 1-113.295.5118 (7 a.m. to 7 p.m.)       Diagnosis: Cough  Diagnosis ICD: R05

## 2020-12-09 ENCOUNTER — VIRTUAL VISIT (OUTPATIENT)
Dept: FAMILY MEDICINE | Facility: OTHER | Age: 40
End: 2020-12-09

## 2020-12-09 NOTE — PROGRESS NOTES
"Date: 2020 11:14:30  Clinician: Anneliese Sams  Clinician NPI: [id number]  Patient: Harleen Riddle  Patient : 1980  Patient Address[address] [place] [address]  Patient Phone: [phone]  Visit Protocol: URI  Patient Summary:  Harleen is a 40 year old ( : 1980 ) female who initiated a [facility] Visit for COVID-19 (Coronavirus) evaluation and screening. When asked the question \"Please sign me up to receive news, health information and promotions. \", Harleen responded \"No\".    Harleen states her symptoms started today.   Her symptoms consist of a headache and rhinitis.   Symptom details     Nasal secretions: The color of her mucus is clear.    Headache: She states the headache is mild (1-3 on a 10 point pain scale).      Harleen denies having ear pain, wheezing, fever, cough, nasal congestion, nausea, vomiting, facial pain or pressure, myalgias, chills, malaise, sore throat, teeth pain, ageusia, diarrhea, and anosmia. She also denies taking antibiotic medication in the past month and having recent facial or sinus surgery in the past 60 days. She is not experiencing dyspnea.    Pertinent COVID-19 (Coronavirus) information  Harleen does not work or volunteer as healthcare worker or a . In the past 14 days, Harleen has not worked or volunteered at a healthcare facility or group living setting.   In the past 14 days, she also has not lived in a congregate living setting.   Harleen has had a close contact with a laboratory-confirmed COVID-19 patient within 14 days of symptom onset. She was not exposed at her work. Date Harleen was exposed to the laboratory-confirmed COVID-19 patient: 2020   Additional information about contact with COVID-19 (Coronavirus) patient as reported by the patient (free text): Albert Taveras took his test on the 2nd,  I believe, and pretty much everyday before that we were together.    Since 2019, Harleen has not been tested for " COVID-19 and has had upper respiratory infection (URI) or influenza-like illness.      Date(s) of previous URI or influenza-like illness (free-text): First week in February     Symptoms Harleen experienced during previous URI or influenza-like illness as reported by the patient (free-text): 102 temp,  severe body aches,  severe cough that lasted a few months,  lost my voice,  shortness of breath        Pertinent medical history  She has not been told by her provider to avoid NSAIDs.   Harleen does not get yeast infections when she takes antibiotics.   Harleen does not have diabetes. She denies having immunosuppressive conditions (e.g., chemotherapy, HIV, organ transplant, long-term use of steroids or other immunosuppressive medications, splenectomy). She does not have severe COPD and congestive heart failure. She does not have asthma.   Harleen needs a return to work/school note.   Weight: 200 lbs   Harleen smokes or uses smokeless tobacco.   She denies pregnancy and denies breastfeeding. She is currently menstruating.   Weight: 200 lbs    MEDICATIONS: No current medications, ALLERGIES: NKDA  Clinician Response:  Dear Harleen,         Your symptoms show that you may have coronavirus (COVID-19). This&nbsp;illness can cause fever, cough and trouble breathing. Many people get a mild case and get better on their own. Some people can get very sick.  What should I do?  We would like to test you for this virus.  1. Please call [phone] to schedule your visit. Explain that you were referred by [facility] to have a COVID-19 test. Be ready to share your [facility] visit ID number. Do not schedule your appointment until you have had at least 2 days of symptoms or you may receive a false negative result.  The following will serve as your written order for this COVID Test, ordered by me, for the indication of suspected COVID [Z20.828]: The test will be ordered in [email or web address], our electronic health record, after you are scheduled. It will  "show as ordered and authorized by Avinash Han MD.  Order: COVID-19 (Coronavirus) PCR for SYMPTOMATIC testing from [facility].    2. When it's time for your COVID test:  Stay at least 6 feet away from others. (If someone will drive you to your test, stay in the backseat, as far away from the  as you can.)  Cover your mouth and nose with a mask, tissue or washcloth.  Go straight to the testing site. Don't make any stops on the way there or back.    3.Starting now:&nbsp;Stay home and away from others (self-isolate) until:   You've had&nbsp;no&nbsp;fever---and no medicine that reduces fever---for one full day (24 hours).&nbsp;And...  Your other symptoms have gotten better. For example, your cough or breathing has improved.&nbsp;And...  At least&nbsp;10 days&nbsp;have passed since your symptoms started.    During this time, don't leave the house except for testing or medical care.   Stay in your own room, even for meals. Use your own bathroom if you can.  Stay away from others in your home. No hugging, kissing or shaking hands. No visitors.  Don't go to work, school or anywhere else.   Clean \"high touch\" surfaces often (doorknobs, counters, handles, etc.). Use a household cleaning spray or wipes. You'll find a full list of  on the EPA website:&nbsp;www.epa.gov/pesticide-registration/list-n-disinfectants-use-against-sars-cov-2.   Cover your mouth and nose with a mask, tissue or washcloth to avoid spreading germs.  Wash your hands and face often. Use soap and water.  Caregivers in these groups are at risk for severe illness due to COVID-19:  o People 65 years and older  o People who live in a nursing home or long-term care facility  o People with chronic disease (lung, heart, cancer, diabetes, kidney, liver, immunologic)  o People who have a weakened immune system, including those who:   Are in cancer treatment  Take medicine that weakens the immune system, such as corticosteroids  Had a bone marrow or organ " transplant  Have an immune deficiency  Have poorly controlled HIV or AIDS  Are obese (body mass index of 40 or higher)  Smoke regularly   o Caregivers should wear gloves while washing dishes, handling laundry and cleaning bedrooms and bathrooms.  o Use caution when washing and drying laundry: Don't shake dirty laundry, and use the warmest water setting that you can.  o For more tips, go to&nbsp;www.cdc.gov/coronavirus/2019-ncov/downloads/10Things.pdf.   How can I take care of myself?    Get lots of rest. Drink extra fluids&nbsp;(unless a doctor has told you not to).  Take Tylenol (acetaminophen) for fever or pain.&nbsp;If you have liver or kidney problems, ask your family doctor if it's okay to take Tylenol.   Adults can take either:   650 mg (two 325 mg pills) every 4 to 6 hours,&nbsp;or...  1,000 mg (two 500 mg pills) every 8 hours as needed.  Note:&nbsp;Don't take more than 3,000 mg in one day. Acetaminophen is found in many medicines (both prescribed and over-the-counter medicines). Read all labels to be sure you don't take too much.   For children, check the Tylenol bottle for the right dose. The dose is based on the child's age or weight.   If you have other health problems (like cancer, heart failure, an organ transplant or severe kidney disease):&nbsp;Call your specialty clinic if you don't feel better in the next 2 days.    Know when to call 911.&nbsp;Emergency warning signs include:   Trouble breathing or shortness of breath Pain or pressure in the chest that doesn't go away Feeling confused like you haven't felt before, or not being able to wake up Bluish-colored lips or face.  Where can I get more information?   [facility] -- About COVID-19:&nbsp;[email or web address]  CDC -- What to Do If You're Sick:&nbsp;www.cdc.gov/coronavirus/2019-ncov/about/steps-when-sick.html  CDC -- Ending Home Isolation:&nbsp;www.cdc.gov/coronavirus/2019-ncov/hcp/disposition-in-home-patients.html  CDC --  Caring for Someone:&nbsp;www.cdc.gov/coronavirus/2019-ncov/if-you-are-sick/care-for-someone.html  [facility] -- Interim Guidance for Hospital Discharge to Home:&nbsp;www.health.[facility].[place].us/diseases/coronavirus/hcp/hospdischarge.pdf  [facility] clinical trials (COVID-19 research studies):&nbsp;clinicalaffairs.[facility]-clinical-trials  Below are the COVID-19 hotlines at the [place] Department of Health ([facility]). Interpreters are available.   For health questions: Call 148-930-5111 or 1-588.239.1511 (7 a.m. to 7 p.m.) For questions about schools and childcare: Call 806-929-2283 or 1-701.660.3819 (7 a.m. to 7 p.m.)           Diagnosis: Contact with and (suspected) exposure to other viral communicable diseases  Diagnosis ICD: Z20.828

## 2020-12-11 ENCOUNTER — ALLIED HEALTH/NURSE VISIT (OUTPATIENT)
Dept: FAMILY MEDICINE | Facility: OTHER | Age: 40
End: 2020-12-11
Attending: FAMILY MEDICINE
Payer: OTHER GOVERNMENT

## 2020-12-11 DIAGNOSIS — R09.81 NASAL CONGESTION: Primary | ICD-10-CM

## 2020-12-11 PROCEDURE — U0003 INFECTIOUS AGENT DETECTION BY NUCLEIC ACID (DNA OR RNA); SEVERE ACUTE RESPIRATORY SYNDROME CORONAVIRUS 2 (SARS-COV-2) (CORONAVIRUS DISEASE [COVID-19]), AMPLIFIED PROBE TECHNIQUE, MAKING USE OF HIGH THROUGHPUT TECHNOLOGIES AS DESCRIBED BY CMS-2020-01-R: HCPCS | Mod: ZL | Performed by: FAMILY MEDICINE

## 2020-12-11 PROCEDURE — C9803 HOPD COVID-19 SPEC COLLECT: HCPCS

## 2020-12-13 LAB
SARS-COV-2 RNA SPEC QL NAA+PROBE: NOT DETECTED
SPECIMEN SOURCE: NORMAL

## 2020-12-16 ENCOUNTER — TELEPHONE (OUTPATIENT)
Dept: FAMILY MEDICINE | Facility: OTHER | Age: 40
End: 2020-12-16

## 2020-12-16 NOTE — TELEPHONE ENCOUNTER
Reason for call: Request for results.    Name of test or procedure: Covid    Date of test or procedure: 12/10/2020    Location of test or procedure: [facility]    Preferred method for responding to this message: Telephone Call    Phone number patient can be reached at: Home number on file [phone] (home)    If we can't reach you directly, may we leave a detailed response at the number you provided?Yes

## 2020-12-16 NOTE — TELEPHONE ENCOUNTER
Returned patients call and informed of negative COVID results and informed letter sent to her on 12/13/2020  Leah Stern RN on 12/16/2020 at 11:26 AM

## 2021-01-03 ENCOUNTER — HEALTH MAINTENANCE LETTER (OUTPATIENT)
Age: 41
End: 2021-01-03

## 2021-09-22 ENCOUNTER — ALLIED HEALTH/NURSE VISIT (OUTPATIENT)
Dept: FAMILY MEDICINE | Facility: OTHER | Age: 41
End: 2021-09-22
Attending: FAMILY MEDICINE
Payer: COMMERCIAL

## 2021-09-22 DIAGNOSIS — R05.9 COUGH: Primary | ICD-10-CM

## 2021-09-22 PROCEDURE — U0005 INFEC AGEN DETEC AMPLI PROBE: HCPCS | Mod: ZL

## 2021-09-22 PROCEDURE — C9803 HOPD COVID-19 SPEC COLLECT: HCPCS

## 2021-09-22 NOTE — PROGRESS NOTES
Patient here Covid Testing.   no exposure, congestion, cough sore throat  Leah Stern RN on 9/22/2021 at 3:04 PM

## 2021-09-24 LAB — SARS-COV-2 RNA RESP QL NAA+PROBE: NEGATIVE

## 2021-09-27 ENCOUNTER — TELEPHONE (OUTPATIENT)
Dept: FAMILY MEDICINE | Facility: OTHER | Age: 41
End: 2021-09-27

## 2021-09-27 NOTE — TELEPHONE ENCOUNTER
Call made to patient to relay negative covid result. Patient had no further questions or concerns at this time. Harleen Rivera RN ....................  9/27/2021   1:31 PM

## 2021-10-05 ENCOUNTER — OFFICE VISIT (OUTPATIENT)
Dept: FAMILY MEDICINE | Facility: OTHER | Age: 41
End: 2021-10-05
Attending: PHYSICIAN ASSISTANT
Payer: COMMERCIAL

## 2021-10-05 VITALS
HEIGHT: 65 IN | WEIGHT: 228.9 LBS | HEART RATE: 67 BPM | OXYGEN SATURATION: 98 % | RESPIRATION RATE: 20 BRPM | DIASTOLIC BLOOD PRESSURE: 64 MMHG | TEMPERATURE: 96.9 F | SYSTOLIC BLOOD PRESSURE: 112 MMHG | BODY MASS INDEX: 38.14 KG/M2

## 2021-10-05 DIAGNOSIS — J06.9 VIRAL URI WITH COUGH: Primary | ICD-10-CM

## 2021-10-05 PROCEDURE — G0463 HOSPITAL OUTPT CLINIC VISIT: HCPCS

## 2021-10-05 PROCEDURE — 99213 OFFICE O/P EST LOW 20 MIN: CPT | Performed by: NURSE PRACTITIONER

## 2021-10-05 ASSESSMENT — PATIENT HEALTH QUESTIONNAIRE - PHQ9: SUM OF ALL RESPONSES TO PHQ QUESTIONS 1-9: 22

## 2021-10-05 ASSESSMENT — MIFFLIN-ST. JEOR: SCORE: 1705.19

## 2021-10-05 NOTE — LETTER
[facility]  [address]  [place] [address]  Phone: [phone]  Fax: [phone]    October 5, 2021        Harleen Riddle  [address] [place] [id number]          To whom it may concern:    RE: Harleen Riddle    Patient was seen and treated today at our clinic and missed work due to illness.  Patient had negative Covid testing on 9/22/21.  Patient is afebrile and symptoms are improving.    Patient may return to work on 10/6/21.    Please contact me for questions or concerns.      Sincerely,        Ilana Blandon NP

## 2021-10-05 NOTE — PROGRESS NOTES
ASSESSMENT/PLAN:     I have reviewed the nursing notes.  I have reviewed the findings, diagnosis, plan and need for follow up with the patient.    1. Viral URI with cough    Negative Covid testing on 9/22/21.  Declines repeat testing today.  Discussed with patient that symptoms and exam are consistent with viral illness.    Normal exam, no clinical indications for antibiotics    Symptomatic treatment - Encouraged fluids, salt water gargles, honey, elevation, humidifier, sinus rinse/netti pot, lozenges, tea, topical vapor rub, popsicles,soup, rest, etc   May use over the counter cough or cold medication PRN  May use over-the-counter Tylenol or ibuprofen PRN    Discussed warning signs/symptoms indicative of need to f/u  Follow up if symptoms persist or worsen or concerns      I explained my diagnostic considerations and recommendations to the patient, who voiced understanding and agreement with the treatment plan. All questions were answered. We discussed potential side effects of any prescribed or recommended therapies, as well as expectations for response to treatments.    Ilana Blandon NP  [facility]      SUBJECTIVE:     Harleen Riddle is a 40 year old female who presents to clinic today for the following health issues:  URI    HPI  Started with sore throat 2 weeks ago, lasted a day and resolved.  Congested cough, shortness of breath, stuffy/runny nose, and fatigue for the past 2 weeks.  Chest tightness and heaviness initially, lessening.  Intermittent chills.  No known fevers.  Caffeine withdrawl headaches.  Nausea 2 days ago.  No vomiting.  No change in appetite.  Increased water intake.    Negative covid test on 9/22  Both her daughters with same symptoms  No OTC medications   Not covid vaccinated        Patient Active Problem List    Diagnosis Date Noted     Tobacco abuse 02/12/2018     Priority: Medium     Overview:   Intermittent       Dysmenorrhea 12/31/2014     Priority:  "Medium     Obesity 2014     Priority: Medium     Past Medical History:   Diagnosis Date     Anogenital (venereal) warts     No Comments Provided     Personal history of other infectious and parasitic diseases     , treated with Zithromax     Personal history of other medical treatment (CODE)          Personal history of other medical treatment (CODE)     2003, ,History of greater than 4000 gram infant      Past Surgical History:   Procedure Laterality Date     OTHER SURGICAL HISTORY      ,13263.0,CT LIGATE FALLOPIAN TUBE     OTHER SURGICAL HISTORY      ,90883.0,CT INCISION AND DRAINAGE OF HEMATOMA/SEROMA OR FLUID,post-tubal ligation, rt salpingectomy     Social History     Tobacco Use     Smoking status: Current Some Day Smoker     Types: Cigarettes     Start date: 10/23/1998     Smokeless tobacco: Never Used   Substance Use Topics     Alcohol use: Yes     Alcohol/week: 0.0 standard drinks     Comment: Alcoholic Drinks/day: rare     Social History     Social History Narrative     Works at [employer].    [place]  Son born 2003    Bianca  Daughter born 10/06    Daughter:  Natalia     No current outpatient medications on file.     No Known Allergies        OBJECTIVE:     /64   Pulse 67   Temp 96.9  F (36.1  C) (Tympanic)   Resp 20   Ht 1.645 m (5' 4.75\")   Wt 103.8 kg (228 lb 14.4 oz)   LMP 10/01/2021 (Exact Date)   SpO2 98%   BMI 38.39 kg/m    Body mass index is 38.39 kg/m .     Physical Exam  General Appearance: Well appearing adult female, non ill appearance, appropriate appearance for age. No acute distress  Ears: Left TM intact, translucent with bony landmarks appreciated, no erythema, no effusion, no bulging, no purulence.  Right TM intact, translucent with bony landmarks appreciated, no erythema, no effusion, no bulging, no purulence.  Left auditory canal clear.  Right auditory canal clear.  Normal external ears, non tender.  Orophayrnx: moist mucous membranes, posterior " pharynx without erythema, tonsils without hypertrophy, no erythema, no exudates or petechiae, no post nasal drip seen, no trismus, voice clear.    Nose:  No noted drainage   Neck: supple without adenopathy  Respiratory: normal chest wall and respirations.  Normal effort.  Clear to auscultation bilaterally, no wheezing, crackles or rhonchi.  No increased work of breathing.  No cough appreciated.  Cardiac: RRR with no murmurs   Musculoskeletal:  Equal movement of bilateral upper extremities.  Equal movement of bilateral lower extremities.  Normal gait.    Psychological: normal affect, alert, oriented, and pleasant.

## 2021-10-05 NOTE — PATIENT INSTRUCTIONS

## 2021-10-09 ENCOUNTER — HEALTH MAINTENANCE LETTER (OUTPATIENT)
Age: 41
End: 2021-10-09

## 2022-01-29 ENCOUNTER — HEALTH MAINTENANCE LETTER (OUTPATIENT)
Age: 42
End: 2022-01-29

## 2022-09-17 ENCOUNTER — HEALTH MAINTENANCE LETTER (OUTPATIENT)
Age: 42
End: 2022-09-17

## 2022-09-21 ENCOUNTER — APPOINTMENT (OUTPATIENT)
Dept: GENERAL RADIOLOGY | Facility: OTHER | Age: 42
End: 2022-09-21
Attending: FAMILY MEDICINE
Payer: COMMERCIAL

## 2022-09-21 ENCOUNTER — HOSPITAL ENCOUNTER (EMERGENCY)
Facility: OTHER | Age: 42
Discharge: HOME | End: 2022-09-21
Attending: FAMILY MEDICINE | Admitting: FAMILY MEDICINE
Payer: COMMERCIAL

## 2022-09-21 VITALS
HEIGHT: 65 IN | BODY MASS INDEX: 36.65 KG/M2 | OXYGEN SATURATION: 97 % | WEIGHT: 220 LBS | SYSTOLIC BLOOD PRESSURE: 62 MMHG | RESPIRATION RATE: 17 BRPM | HEART RATE: 92 BPM | DIASTOLIC BLOOD PRESSURE: 35 MMHG | TEMPERATURE: 97.3 F

## 2022-09-21 DIAGNOSIS — M54.50 ACUTE BILATERAL LOW BACK PAIN WITHOUT SCIATICA: ICD-10-CM

## 2022-09-21 LAB
ALBUMIN SERPL-MCNC: 4 G/DL (ref 3.5–5.7)
ALP SERPL-CCNC: 61 U/L (ref 34–104)
ALT SERPL W P-5'-P-CCNC: 13 U/L (ref 7–52)
ANION GAP SERPL CALCULATED.3IONS-SCNC: 9 MMOL/L (ref 3–14)
APTT PPP: 30 SECONDS (ref 22–38)
AST SERPL W P-5'-P-CCNC: 15 U/L (ref 13–39)
BASE EXCESS BLDV CALC-SCNC: 0 MMOL/L (ref -7.7–1.9)
BASOPHILS # BLD AUTO: 0.1 10E3/UL (ref 0–0.2)
BASOPHILS NFR BLD AUTO: 1 %
BILIRUB SERPL-MCNC: 0.5 MG/DL (ref 0.3–1)
BUN SERPL-MCNC: 6 MG/DL (ref 7–25)
CALCIUM SERPL-MCNC: 9.4 MG/DL (ref 8.6–10.3)
CHLORIDE BLD-SCNC: 103 MMOL/L (ref 98–107)
CO2 SERPL-SCNC: 25 MMOL/L (ref 21–31)
CREAT SERPL-MCNC: 0.67 MG/DL (ref 0.6–1.2)
CRP SERPL HS-MCNC: 32.1 MG/L
EOSINOPHIL # BLD AUTO: 0.1 10E3/UL (ref 0–0.7)
EOSINOPHIL NFR BLD AUTO: 1 %
ERYTHROCYTE [DISTWIDTH] IN BLOOD BY AUTOMATED COUNT: 12.8 % (ref 10–15)
ERYTHROCYTE [SEDIMENTATION RATE] IN BLOOD BY WESTERGREN METHOD: 14 MM/HR (ref 0–20)
ETHANOL SERPL-MCNC: <0.01 G/DL
FLUAV RNA SPEC QL NAA+PROBE: NEGATIVE
FLUBV RNA RESP QL NAA+PROBE: NEGATIVE
GFR SERPL CREATININE-BSD FRML MDRD: >90 ML/MIN/1.73M2
GLUCOSE BLD-MCNC: 121 MG/DL (ref 70–105)
HCO3 BLDV-SCNC: 27 MMOL/L (ref 21–28)
HCT VFR BLD AUTO: 42.4 % (ref 35–47)
HGB BLD-MCNC: 14.5 G/DL (ref 11.7–15.7)
IMM GRANULOCYTES # BLD: 0.1 10E3/UL
IMM GRANULOCYTES NFR BLD: 0 %
INR PPP: 1.03 (ref 0.85–1.15)
LACTATE SERPL-SCNC: 1.7 MMOL/L (ref 0.7–2)
LYMPHOCYTES # BLD AUTO: 1.4 10E3/UL (ref 0.8–5.3)
LYMPHOCYTES NFR BLD AUTO: 13 %
MCH RBC QN AUTO: 29.8 PG (ref 26.5–33)
MCHC RBC AUTO-ENTMCNC: 34.2 G/DL (ref 31.5–36.5)
MCV RBC AUTO: 87 FL (ref 78–100)
MONOCYTES # BLD AUTO: 0.6 10E3/UL (ref 0–1.3)
MONOCYTES NFR BLD AUTO: 5 %
NEUTROPHILS # BLD AUTO: 9.1 10E3/UL (ref 1.6–8.3)
NEUTROPHILS NFR BLD AUTO: 80 %
NRBC # BLD AUTO: 0 10E3/UL
NRBC BLD AUTO-RTO: 0 /100
O2/TOTAL GAS SETTING VFR VENT: 0 %
OXYHGB MFR BLDV: 53 % (ref 70–75)
PCO2 BLDV: 50 MM HG (ref 40–50)
PH BLDV: 7.33 [PH] (ref 7.32–7.43)
PLATELET # BLD AUTO: 352 10E3/UL (ref 150–450)
PO2 BLDV: 29 MM HG (ref 25–47)
POTASSIUM BLD-SCNC: 4 MMOL/L (ref 3.5–5.1)
PROT SERPL-MCNC: 7.3 G/DL (ref 6.4–8.9)
RBC # BLD AUTO: 4.87 10E6/UL (ref 3.8–5.2)
RSV RNA SPEC NAA+PROBE: NEGATIVE
SARS-COV-2 RNA RESP QL NAA+PROBE: NEGATIVE
SODIUM SERPL-SCNC: 137 MMOL/L (ref 134–144)
WBC # BLD AUTO: 11.3 10E3/UL (ref 4–11)

## 2022-09-21 PROCEDURE — 85652 RBC SED RATE AUTOMATED: CPT | Performed by: FAMILY MEDICINE

## 2022-09-21 PROCEDURE — 83605 ASSAY OF LACTIC ACID: CPT | Performed by: FAMILY MEDICINE

## 2022-09-21 PROCEDURE — C9803 HOPD COVID-19 SPEC COLLECT: HCPCS | Performed by: FAMILY MEDICINE

## 2022-09-21 PROCEDURE — 82805 BLOOD GASES W/O2 SATURATION: CPT | Performed by: FAMILY MEDICINE

## 2022-09-21 PROCEDURE — 86141 C-REACTIVE PROTEIN HS: CPT | Performed by: FAMILY MEDICINE

## 2022-09-21 PROCEDURE — 96374 THER/PROPH/DIAG INJ IV PUSH: CPT | Performed by: FAMILY MEDICINE

## 2022-09-21 PROCEDURE — 82077 ASSAY SPEC XCP UR&BREATH IA: CPT | Performed by: FAMILY MEDICINE

## 2022-09-21 PROCEDURE — 72100 X-RAY EXAM L-S SPINE 2/3 VWS: CPT

## 2022-09-21 PROCEDURE — 80053 COMPREHEN METABOLIC PANEL: CPT | Performed by: FAMILY MEDICINE

## 2022-09-21 PROCEDURE — 96361 HYDRATE IV INFUSION ADD-ON: CPT | Performed by: FAMILY MEDICINE

## 2022-09-21 PROCEDURE — 85730 THROMBOPLASTIN TIME PARTIAL: CPT | Performed by: FAMILY MEDICINE

## 2022-09-21 PROCEDURE — 99285 EMERGENCY DEPT VISIT HI MDM: CPT | Mod: 25 | Performed by: FAMILY MEDICINE

## 2022-09-21 PROCEDURE — 85610 PROTHROMBIN TIME: CPT | Performed by: FAMILY MEDICINE

## 2022-09-21 PROCEDURE — 71045 X-RAY EXAM CHEST 1 VIEW: CPT

## 2022-09-21 PROCEDURE — 250N000011 HC RX IP 250 OP 636: Performed by: FAMILY MEDICINE

## 2022-09-21 PROCEDURE — 99284 EMERGENCY DEPT VISIT MOD MDM: CPT | Performed by: FAMILY MEDICINE

## 2022-09-21 PROCEDURE — 87040 BLOOD CULTURE FOR BACTERIA: CPT | Performed by: FAMILY MEDICINE

## 2022-09-21 PROCEDURE — 250N000013 HC RX MED GY IP 250 OP 250 PS 637: Performed by: FAMILY MEDICINE

## 2022-09-21 PROCEDURE — 258N000003 HC RX IP 258 OP 636: Performed by: FAMILY MEDICINE

## 2022-09-21 PROCEDURE — 87637 SARSCOV2&INF A&B&RSV AMP PRB: CPT | Performed by: FAMILY MEDICINE

## 2022-09-21 PROCEDURE — 85025 COMPLETE CBC W/AUTO DIFF WBC: CPT | Performed by: FAMILY MEDICINE

## 2022-09-21 RX ORDER — KETOROLAC TROMETHAMINE 30 MG/ML
30 INJECTION, SOLUTION INTRAMUSCULAR; INTRAVENOUS ONCE
Status: COMPLETED | OUTPATIENT
Start: 2022-09-21 | End: 2022-09-21

## 2022-09-21 RX ORDER — ACETAMINOPHEN 500 MG
1000 TABLET ORAL ONCE
Status: COMPLETED | OUTPATIENT
Start: 2022-09-21 | End: 2022-09-21

## 2022-09-21 RX ADMIN — SODIUM CHLORIDE 1000 ML: 9 INJECTION, SOLUTION INTRAVENOUS at 16:37

## 2022-09-21 RX ADMIN — KETOROLAC TROMETHAMINE 30 MG: 30 INJECTION, SOLUTION INTRAMUSCULAR; INTRAVENOUS at 17:09

## 2022-09-21 RX ADMIN — ACETAMINOPHEN 1000 MG: 500 TABLET ORAL at 18:29

## 2022-09-21 ASSESSMENT — ACTIVITIES OF DAILY LIVING (ADL): ADLS_ACUITY_SCORE: 37

## 2022-09-21 ASSESSMENT — ENCOUNTER SYMPTOMS: BACK PAIN: 1

## 2022-09-21 NOTE — DISCHARGE INSTRUCTIONS
Harleen    Your labs and xrays  were all good today. I think the IV fluids helped a bit as did the Toradol.  I recommend Tylenol and ibuprofen as needed.    Thank you for choosing our Emergency Department for your care.     You may receive a phone call or letter for a survey about your care in our ED.  Please complete this as this is how we improve care for our patients.     If you have any questions after leaving the ED you can call me in the ED at [phone].    Sincerely,    Dr Mahin Davies M.D.

## 2022-09-21 NOTE — ED PROVIDER NOTES
History     Chief Complaint   Patient presents with     Back Pain     Here with her dad    The history is provided by the patient, medical records and a parent.     Harleen Riddle is a 41 year old female here with back pain. Onset of pain was Sunday night, four days ago, and she had persistent pain Monday,  Tuesday and today. She tried Tylenol with no help. She has not taken ibuprofen. She has been trying gas station THC gummies (ate 5 of them- the package has 50 mg THC in 10 or 15 gummies). She started to get sweaty and hot when she came into the ED and had to put on a mask.     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     She has not been to a clinic or hospital in about five years. No daily meds. [place] Prescription Drug Monitoring has no data on her.     Allergies:  No Known Allergies    Problem List:    Patient Active Problem List    Diagnosis Date Noted     Tobacco abuse 02/12/2018     Priority: Medium     Overview:   Intermittent       Dysmenorrhea 12/31/2014     Priority: Medium     Obesity 12/31/2014     Priority: Medium        Past Medical History:    Past Medical History:   Diagnosis Date     Anogenital (venereal) warts      Personal history of other infectious and parasitic diseases      Personal history of other medical treatment (CODE)      Personal history of other medical treatment (CODE)        Past Surgical History:    Past Surgical History:   Procedure Laterality Date     OTHER SURGICAL HISTORY      2010,98045.0,[place] LIGATE FALLOPIAN TUBE     OTHER SURGICAL HISTORY      2010,71111.0,[place] INCISION AND DRAINAGE OF HEMATOMA/SEROMA OR FLUID,post-tubal ligation, rt salpingectomy       Family History:    Family History   Problem Relation Age of Onset     Thyroid Disease Mother         Thyroid Disease     Hypertension Father         Hypertension     Other - See Comments  "Father         Back problems     Genetic Disorder Other         Genetic,Mother History of thyroid disease.-Father History of hypertension and back problems.-Siblings Sister with history of Down syndrome. -Brother with history of mental retardation.  -Mukesh  Son born 8/2003-Bianca  Daughter born 10/06     Other - See Comments Sister         Downs syndrome     Other - See Comments Brother         Mental retardation       Social History:  Marital Status:  Single [1]  Social History     Tobacco Use     Smoking status: Current Some Day Smoker     Types: Cigarettes     Start date: 10/23/1998     Smokeless tobacco: Never Used   Vaping Use     Vaping Use: Never used   Substance Use Topics     Alcohol use: Yes     Alcohol/week: 0.0 standard drinks     Comment: Alcoholic Drinks/day: rare     Drug use: No        Medications:    No current outpatient medications on file.        Review of Systems   Musculoskeletal: Positive for back pain.   All other systems reviewed and are negative.      Physical Exam   BP: (!) 62/35  Pulse: 92  Temp: 97.3  F (36.3  C)  Resp: 17  Height: 164.5 cm (5' 4.75\")  Weight: 99.8 kg (220 lb)  SpO2: 97 %      Physical Exam  Vitals and nursing note reviewed.   Constitutional:       Appearance: She is ill-appearing and diaphoretic.   Cardiovascular:      Rate and Rhythm: Normal rate and regular rhythm.      Pulses: Normal pulses.      Heart sounds: Normal heart sounds.   Pulmonary:      Effort: Pulmonary effort is normal. No respiratory distress.      Breath sounds: Normal breath sounds.   Abdominal:      General: Bowel sounds are normal.      Palpations: Abdomen is soft.      Comments: She has back tenderness when I press on her lower abdomen   Musculoskeletal:         General: Tenderness present. No swelling.      Comments: She has a broad area of tenderness of the low back across the top of the pelvis.  No point tenderness.    Skin:     General: Skin is warm.      Findings: No erythema or rash. "   Neurological:      General: No focal deficit present.      Mental Status: She is oriented to person, place, and time.   Psychiatric:         Mood and Affect: Mood normal.         Behavior: Behavior normal.         Results for orders placed or performed during the hospital encounter of 09/21/22 (from the past 24 hour(s))   CBC with platelets differential    Narrative    The following orders were created for panel order CBC with platelets differential.  Procedure                               Abnormality         Status                     ---------                               -----------         ------                     CBC with platelets and d...[[id number]]  Abnormal            Final result                 Please view results for these tests on the individual orders.   Comprehensive metabolic panel   Result Value Ref Range    Sodium 137 134 - 144 mmol/L    Potassium 4.0 3.5 - 5.1 mmol/L    Chloride 103 98 - 107 mmol/L    Carbon Dioxide (CO2) 25 21 - 31 mmol/L    Anion Gap 9 3 - 14 mmol/L    Urea Nitrogen 6 (L) 7 - 25 mg/dL    Creatinine 0.67 0.60 - 1.20 mg/dL    Calcium 9.4 8.6 - 10.3 mg/dL    Glucose 121 (H) 70 - 105 mg/dL    Alkaline Phosphatase 61 34 - 104 U/L    AST 15 13 - 39 U/L    ALT 13 7 - 52 U/L    Protein Total 7.3 6.4 - 8.9 g/dL    Albumin 4.0 3.5 - 5.7 g/dL    Bilirubin Total 0.5 0.3 - 1.0 mg/dL    GFR Estimate >90 >60 mL/min/1.73m2   Lactic acid whole blood   Result Value Ref Range    Lactic Acid 1.7 0.7 - 2.0 mmol/L   Blood gas venous and oxyhgb   Result Value Ref Range    pH Venous 7.33 7.32 - 7.43    pCO2 Venous 50 40 - 50 mm Hg    pO2 Venous 29 25 - 47 mm Hg    Bicarbonate Venous 27 21 - 28 mmol/L    FIO2 0     Oxyhemoglobin Venous 53 (L) 70 - 75 %    Base Excess/Deficit (+/-) 0.0 -7.7 - 1.9 mmol/L   INR   Result Value Ref Range    INR 1.03 0.85 - 1.15   Partial thromboplastin time   Result Value Ref Range    aPTT 30 22 - 38 Seconds   C-Reactive Protein high sensitivity GH   Result Value Ref  Range    C-Reactive Protein High Sensitivity 32.1 mg/L   Erythrocyte sedimentation rate auto   Result Value Ref Range    Erythrocyte Sedimentation Rate 14 0 - 20 mm/hr   Ethanol GH   Result Value Ref Range    Alcohol ethyl <0.01 <=0.01 g/dL   CBC with platelets and differential   Result Value Ref Range    WBC Count 11.3 (H) 4.0 - 11.0 10e3/uL    RBC Count 4.87 3.80 - 5.20 10e6/uL    Hemoglobin 14.5 11.7 - 15.7 g/dL    Hematocrit 42.4 35.0 - 47.0 %    MCV 87 78 - 100 fL    MCH 29.8 26.5 - 33.0 pg    MCHC 34.2 31.5 - 36.5 g/dL    RDW 12.8 10.0 - 15.0 %    Platelet Count 352 150 - 450 10e3/uL    % Neutrophils 80 %    % Lymphocytes 13 %    % Monocytes 5 %    % Eosinophils 1 %    % Basophils 1 %    % Immature Granulocytes 0 %    NRBCs per 100 WBC 0 <1 /100    Absolute Neutrophils 9.1 (H) 1.6 - 8.3 10e3/uL    Absolute Lymphocytes 1.4 0.8 - 5.3 10e3/uL    Absolute Monocytes 0.6 0.0 - 1.3 10e3/uL    Absolute Eosinophils 0.1 0.0 - 0.7 10e3/uL    Absolute Basophils 0.1 0.0 - 0.2 10e3/uL    Absolute Immature Granulocytes 0.1 <=0.4 10e3/uL    Absolute NRBCs 0.0 10e3/uL   Symptomatic; Unknown Influenza A/B & SARS-CoV2 (COVID-19) Virus PCR Multiplex Nose    Specimen: Nose; Swab   Result Value Ref Range    Influenza A PCR Negative Negative    Influenza B PCR Negative Negative    RSV PCR Negative Negative    SARS CoV2 PCR Negative Negative    Narrative    Testing was performed using the Xpert Xpress CoV2/Flu/RSV Assay on the [employer] GeneXpert Instrument. This test should be ordered for the detection of SARS-CoV-2 and influenza viruses in individuals who meet clinical and/or epidemiological criteria. Test performance is unknown in asymptomatic patients. This test is for in vitro diagnostic use under the FDA EUA for laboratories certified under CLIA to perform high or moderate complexity testing. This test has not been FDA cleared or approved. A negative result does not rule out the presence of PCR inhibitors in the specimen or  target RNA in concentration below the limit of detection for the assay. If only one viral target is positive but coinfection with multiple targets is suspected, the sample should be re-tested with another FDA cleared, approved, or authorized test, if coinfection would change clinical management. This test was validated by the [facility]. These laboratories are certified under the Clinical  Laboratory Improvement Amendments of 1988 (CLIA-88) as qualified to perform high complexity laboratory testing.   XR Chest Port 1 View    Narrative    PROCEDURE:  XR CHEST PORT 1 VIEW    HISTORY: Fever. .    COMPARISON:  None.    FINDINGS:    The cardiomediastinal contours are normal.  No focal consolidation, effusion or pneumothorax.      Impression    IMPRESSION:  Negative portable chest.      ISHA VINSON MD         SYSTEM ID:  [id number]   XR Lumbar Spine 2/3 Views    Narrative    XR LUMBAR SPINE 2/3 VIEWS    HISTORY: low back pain, no trauma .    COMPARISON: None.    TECHNIQUE: 3 views of the lumbosacral spine.    FINDINGS:    No acute fracture. Preserved lumbar lordosis. Slight rightward coronal  curvature. Mild facet degenerative hypertrophy at L5-S1.      ISHA VINSON MD         SYSTEM ID:  [id number]       Medications   sodium chloride (PF) 0.9% PF flush 3 mL (has no administration in time range)   sodium chloride (PF) 0.9% PF flush 3 mL (3 mLs Intracatheter Given 9/21/22 1638)   0.9% sodium chloride BOLUS (1,000 mLs Intravenous New Bag 9/21/22 1637)   ketorolac (TORADOL) injection 30 mg (30 mg Intravenous Given 9/21/22 1709)   acetaminophen (TYLENOL) tablet 1,000 mg (1,000 mg Oral Given 9/21/22 1829)       Assessments & Plan (with Medical Decision Making)  Harleen Riddle is a 41 year old female here with back pain. Onset of pain was Sunday night, four days ago, and she had persistent pain Monday,  Tuesday and today. She tried Tylenol with no help. She has not taken ibuprofen. She  "has been trying gas station THC gummies (ate 5 of them- the package has 50 mg THC in 10 or 15 gummies). She started to get sweaty and hot when she came into the ED and had to put on a mask.  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  She has not been to a clinic or hospital in about five years. No daily meds. [place] Prescription Drug Monitoring has no data on her.  VS in the ED BP (!) 62/35   Pulse 92   Temp 97.3  F (36.3  C) (Tympanic)   Resp 17   Ht 1.645 m (5' 4.75\")   Wt 99.8 kg (220 lb)   LMP 09/14/2022 (Approximate)   SpO2 97%   BMI 36.89 kg/m    Exam shows an ill appearing 42 yo female with back pain. Even with palpation of the abdomen she has back pain. We started an IV and gave some IV fluids.  Labs show CBC with WBC 11,300, CMP with glucose 121, lactic acid normal, INR normal, aPTT normal, VBG okay, ethanol zero, CRP 32, ESR 14. The UA, UPT, and UDS were never collected.   4 Plex negative. BC pending. Chest xray stable. I cannot explain her initial hypotension but I do not see any evidence of infection on labs or VS other than that initial BP of 62/35.  Xray of the lumbar spine is normal. At 6:26 PM her bolus is almost done. She is feeling better and has been sleeping now after the Toradol and improvement in her pain. We will get her some Tylenol and get her home.      I have reviewed the nursing notes.    I have reviewed the findings, diagnosis, plan and need for follow up with the patient.    Final diagnoses:   Acute bilateral low back pain without sciatica       9/21/2022   [facility], Edgar Whitney MD  09/21/22 1829    "

## 2022-09-21 NOTE — ED TRIAGE NOTES
"Pt states her lower back \"is killing me\".  Pt states this has been going on for 2 days and cannot take the pain anymore.  Pt states this has happened before but the pain will usually go away after a couple hours.  Pain stays in the lower pain and does not radiate anywhere.  Pt reports 2 hours ago taking gas station THC gummies, but that did not help.     Triage Assessment     Row Name 09/21/22 0067       Triage Assessment (Adult)    Airway WDL WDL       Respiratory WDL    Respiratory WDL WDL       Skin Circulation/Temperature WDL    Skin Circulation/Temperature WDL WDL       Cardiac WDL    Cardiac WDL WDL       Peripheral/Neurovascular WDL    Peripheral Neurovascular WDL WDL              "

## 2022-09-26 LAB — BACTERIA BLD CULT: NO GROWTH

## 2023-05-06 ENCOUNTER — HEALTH MAINTENANCE LETTER (OUTPATIENT)
Age: 43
End: 2023-05-06

## 2024-02-25 ENCOUNTER — HEALTH MAINTENANCE LETTER (OUTPATIENT)
Age: 44
End: 2024-02-25

## 2024-07-13 ENCOUNTER — HEALTH MAINTENANCE LETTER (OUTPATIENT)
Age: 44
End: 2024-07-13

## 2025-03-09 ENCOUNTER — HEALTH MAINTENANCE LETTER (OUTPATIENT)
Age: 45
End: 2025-03-09

## 2025-07-19 ENCOUNTER — HEALTH MAINTENANCE LETTER (OUTPATIENT)
Age: 45
End: 2025-07-19

## (undated) RX ORDER — ACETAMINOPHEN 500 MG
TABLET ORAL
Status: DISPENSED
Start: 2022-09-21

## (undated) RX ORDER — KETOROLAC TROMETHAMINE 30 MG/ML
INJECTION, SOLUTION INTRAMUSCULAR; INTRAVENOUS
Status: DISPENSED
Start: 2022-09-21